# Patient Record
Sex: MALE | Race: WHITE | NOT HISPANIC OR LATINO | URBAN - METROPOLITAN AREA
[De-identification: names, ages, dates, MRNs, and addresses within clinical notes are randomized per-mention and may not be internally consistent; named-entity substitution may affect disease eponyms.]

---

## 2020-01-03 ENCOUNTER — EMERGENCY (EMERGENCY)
Facility: HOSPITAL | Age: 40
LOS: 0 days | Discharge: HOME | End: 2020-01-04
Attending: EMERGENCY MEDICINE | Admitting: EMERGENCY MEDICINE
Payer: COMMERCIAL

## 2020-01-03 VITALS
RESPIRATION RATE: 16 BRPM | OXYGEN SATURATION: 99 % | DIASTOLIC BLOOD PRESSURE: 82 MMHG | HEART RATE: 79 BPM | TEMPERATURE: 96 F | SYSTOLIC BLOOD PRESSURE: 140 MMHG | WEIGHT: 272.93 LBS

## 2020-01-03 DIAGNOSIS — R21 RASH AND OTHER NONSPECIFIC SKIN ERUPTION: ICD-10-CM

## 2020-01-03 DIAGNOSIS — T36.0X5A ADVERSE EFFECT OF PENICILLINS, INITIAL ENCOUNTER: ICD-10-CM

## 2020-01-03 PROCEDURE — 99283 EMERGENCY DEPT VISIT LOW MDM: CPT

## 2020-01-04 RX ORDER — DIPHENHYDRAMINE HCL 50 MG
25 CAPSULE ORAL EVERY 6 HOURS
Refills: 0 | Status: DISCONTINUED | OUTPATIENT
Start: 2020-01-04 | End: 2020-01-04

## 2020-01-04 RX ADMIN — Medication 50 MILLIGRAM(S): at 01:04

## 2020-01-04 RX ADMIN — Medication 25 MILLIGRAM(S): at 01:04

## 2020-01-04 NOTE — ED PROVIDER NOTE - PHYSICAL EXAMINATION
EXAM:  CONSTITUTIONAL: WA / WN / NAD  HEAD: NCAT  EYES: PERRL; EOMI; anicteric.  ENT: Normal pharynx; mucous membranes pink/moist, no erythema.  NECK: Supple; no meningeal signs  CARD: RRR; nl S1/S2; no M/R/G. Pulses equal bilaterally.  RESP: Respiratory rate and effort are normal; breath sounds clear and equal bilaterally.  ABD: Soft, NT ND nl bowel sounds; no masses; no rebound  MSK/EXT: No gross deformities; full range of motion.  SKIN: diffuse maculopapular rash  NEURO: AAOx3, Motor 5/5 x 4 extremities,

## 2020-01-04 NOTE — ED PROVIDER NOTE - NS ED ROS FT
ROS  Constitutional:  See HPI.  Eyes:  No visual changes, eye pain or discharge.  ENMT:  No hearing changes, pain, discharge or infections.   Cardiac:  No chest pain, SOB or edema.   Respiratory:  No cough or respiratory distress. No hemoptysis.  GI:  No nausea, vomiting, diarrhea or abdominal pain.  :  No dysuria, frequency or burning.  MS:  No myalgia, muscle weakness, joint pain or back pain.  Neuro:  No focal neurological complaints.  Skin:  + rash

## 2020-01-04 NOTE — ED PROVIDER NOTE - OBJECTIVE STATEMENT
39 M to ED with Rash after taking amox for pharyngitis  diffuse maculopapular rash stared on day 4 of medication  no sick contacts, no travels  mild swelling to face.

## 2020-01-04 NOTE — ED PROVIDER NOTE - PATIENT PORTAL LINK FT
You can access the FollowMyHealth Patient Portal offered by [facility] by registering at the following website: [email or web address]. By joining [facility]’s FollowMyHealth portal, you will also be able to view your health information using other applications (apps) compatible with our system.

## 2020-01-04 NOTE — ED PROVIDER NOTE - NSFOLLOWUPINSTRUCTIONS_ED_ALL_ED_FT
Rash, Adult     A rash is a change in the color of your skin. A rash can also change the way your skin feels. There are many different conditions and factors that can cause a rash.  Follow these instructions at home:  The goal of treatment is to stop the itching and keep the rash from spreading. Watch for any changes in your symptoms. Let your doctor know about them. Follow these instructions to help with your condition:  Medicine     Take or apply over-the-counter and prescription medicines only as told by your doctor. These may include medicines:  To treat red or swollen skin (corticosteroid creams).To treat itching.To treat an allergy (oral antihistamines).To treat very bad symptoms (oral corticosteroids).Skin care     Put cool cloths (compresses) on the affected areas.Do not scratch or rub your skin.Avoid covering the rash. Make sure that the rash is exposed to air as much as possible.Managing itching and discomfort     Avoid hot showers or baths. These can make itching worse. A cold shower may help.Try taking a bath with:  Epsom salts. You can get these at your local pharmacy or grocery store. Follow the instructions on the package.Baking soda. Pour a small amount into the bath as told by your doctor.Colloidal oatmeal. You can get this at your local pharmacy or grocery store. Follow the instructions on the package.Try putting baking soda paste onto your skin. Stir water into baking soda until it gets like a paste.Try putting on a lotion that relieves itchiness (calamine lotion).Keep cool and out of the sun. Sweating and being hot can make itching worse.General instructions        Rest as needed.Drink enough fluid to keep your pee (urine) pale yellow.Wear loose-fitting clothing.Avoid scented soaps, detergents, and perfumes. Use gentle soaps, detergents, perfumes, and other cosmetic products.Avoid anything that causes your rash. Keep a journal to help track what causes your rash. Write down:  What you eat.What cosmetic products you use.What you drink.What you wear. This includes jewelry.Keep all follow-up visits as told by your doctor. This is important.Contact a doctor if:  You sweat at night.You lose weight.You pee (urinate) more than normal.You pee less than normal, or you notice that your pee is a darker color than normal.You feel weak.You throw up (vomit).Your skin or the whites of your eyes look yellow (jaundice).Your skin:  Tingles.Is numb.Your rash:  Does not go away after a few days.Gets worse.You are:  More thirsty than normal.More tired than normal.You have:  New symptoms.Pain in your belly (abdomen).A fever.Watery poop (diarrhea).Get help right away if:  You have a fever and your symptoms suddenly get worse.You start to feel mixed up (confused).You have a very bad headache or a stiff neck.You have very bad joint pains or stiffness.You have jerky movements that you cannot control (seizure).Your rash covers all or most of your body. The rash may or may not be painful.You have blisters that:  Are on top of the rash.Grow larger.Grow together.Are painful.Are inside your nose or mouth.You have a rash that:  Looks like purple pinprick-sized spots all over your body.Has a "bull's eye" or looks like a target.Is red and painful, causes your skin to peel, and is not from being in the sun too long.Summary  A rash is a change in the color of your skin. A rash can also change the way your skin feels.The goal of treatment is to stop the itching and keep the rash from spreading.Take or apply over-the-counter and prescription medicines only as told by your doctor.Contact a doctor if you have new symptoms or symptoms that get worse.Keep all follow-up visits as told by your doctor. This is important.This information is not intended to replace advice given to you by your health care provider. Make sure you discuss any questions you have with your health care provider.

## 2020-05-07 ENCOUNTER — TRANSCRIPTION ENCOUNTER (OUTPATIENT)
Age: 40
End: 2020-05-07

## 2020-05-31 ENCOUNTER — TRANSCRIPTION ENCOUNTER (OUTPATIENT)
Age: 40
End: 2020-05-31

## 2020-09-10 NOTE — ED ADULT TRIAGE NOTE - NS ED NURSE DIRECT TO ROOM YN
Yes Colchicine Counseling:  Patient counseled regarding adverse effects including but not limited to stomach upset (nausea, vomiting, stomach pain, or diarrhea).  Patient instructed to limit alcohol consumption while taking this medication.  Colchicine may reduce blood counts especially with prolonged use.  The patient understands that monitoring of kidney function and blood counts may be required, especially at baseline. The patient verbalized understanding of the proper use and possible adverse effects of colchicine.  All of the patient's questions and concerns were addressed.

## 2020-10-12 ENCOUNTER — TRANSCRIPTION ENCOUNTER (OUTPATIENT)
Age: 40
End: 2020-10-12

## 2020-10-16 ENCOUNTER — TRANSCRIPTION ENCOUNTER (OUTPATIENT)
Age: 40
End: 2020-10-16

## 2020-11-09 ENCOUNTER — TRANSCRIPTION ENCOUNTER (OUTPATIENT)
Age: 40
End: 2020-11-09

## 2021-02-23 ENCOUNTER — OUTPATIENT (OUTPATIENT)
Dept: OUTPATIENT SERVICES | Facility: HOSPITAL | Age: 41
LOS: 1 days | Discharge: HOME | End: 2021-02-23

## 2021-02-23 DIAGNOSIS — Z11.59 ENCOUNTER FOR SCREENING FOR OTHER VIRAL DISEASES: ICD-10-CM

## 2021-09-28 PROBLEM — Z00.00 ENCOUNTER FOR PREVENTIVE HEALTH EXAMINATION: Status: ACTIVE | Noted: 2021-09-28

## 2021-10-01 ENCOUNTER — APPOINTMENT (OUTPATIENT)
Dept: SURGERY | Facility: CLINIC | Age: 41
End: 2021-10-01
Payer: COMMERCIAL

## 2021-10-01 ENCOUNTER — TRANSCRIPTION ENCOUNTER (OUTPATIENT)
Age: 41
End: 2021-10-01

## 2021-10-01 VITALS
HEART RATE: 75 BPM | SYSTOLIC BLOOD PRESSURE: 118 MMHG | WEIGHT: 280.4 LBS | DIASTOLIC BLOOD PRESSURE: 74 MMHG | HEIGHT: 69.25 IN | BODY MASS INDEX: 41.06 KG/M2 | OXYGEN SATURATION: 98 % | TEMPERATURE: 97.1 F

## 2021-10-01 DIAGNOSIS — M25.561 PAIN IN RIGHT KNEE: ICD-10-CM

## 2021-10-01 DIAGNOSIS — U07.1 COVID-19: ICD-10-CM

## 2021-10-01 DIAGNOSIS — Z78.9 OTHER SPECIFIED HEALTH STATUS: ICD-10-CM

## 2021-10-01 DIAGNOSIS — E66.01 MORBID (SEVERE) OBESITY DUE TO EXCESS CALORIES: ICD-10-CM

## 2021-10-01 DIAGNOSIS — I10 ESSENTIAL (PRIMARY) HYPERTENSION: ICD-10-CM

## 2021-10-01 PROCEDURE — 99204 OFFICE O/P NEW MOD 45 MIN: CPT

## 2021-10-01 PROCEDURE — 99072 ADDL SUPL MATRL&STAF TM PHE: CPT

## 2021-10-01 RX ORDER — NEBIVOLOL HYDROCHLORIDE 5 MG/1
5 TABLET ORAL DAILY
Refills: 0 | Status: ACTIVE | COMMUNITY

## 2021-10-01 RX ORDER — LOSARTAN POTASSIUM AND HYDROCHLOROTHIAZIDE 12.5; 1 MG/1; MG/1
100-12.5 TABLET ORAL DAILY
Refills: 0 | Status: ACTIVE | COMMUNITY

## 2021-10-01 NOTE — PHYSICAL EXAM
[Normal] : affect appropriate [de-identified] : Mallampati II [de-identified] : Soft, nondistended.

## 2021-10-01 NOTE — HISTORY OF PRESENT ILLNESS
[FreeTextEntry1] : ENRIQUE VENEGAS is a 41 year male who has a overweight for sometime. He gained 30 lbs in the over the last 3 years and is having difficulty losing the weight. Patient states that he works a flex shift and needs dietary support to help him with meal planning. He is very active on his job and we discussed that he was just active and will require a formal exercise plan 3-4 days/week for a minimum of 30 minutes. We discussed realistic weight loss expectations as he would like to lose 80 lbs.

## 2021-10-01 NOTE — REASON FOR VISIT
[Initial Consultation] : an initial consultation for [Obesity] : obesity [FreeTextEntry2] : Patient presents for Medical Weight Loss consultation

## 2021-10-01 NOTE — ASSESSMENT
[FreeTextEntry1] : ENRIQUE VENEGAS is a 41 year male seen today for Medical Weight Loss Consultation. Patient states that he is not interested in antiobesity medication or surgery at this time but wants to work with a dietitian to assist him with meal planning.\par \par Plan: Blood work.\par          Increase exercise.\par          Dietitian evaluation.\par          RTO in 1 month. \par          Call with concerns.

## 2021-10-13 ENCOUNTER — NON-APPOINTMENT (OUTPATIENT)
Age: 41
End: 2021-10-13

## 2021-11-04 ENCOUNTER — APPOINTMENT (OUTPATIENT)
Dept: SURGERY | Facility: CLINIC | Age: 41
End: 2021-11-04

## 2022-09-26 ENCOUNTER — NON-APPOINTMENT (OUTPATIENT)
Age: 42
End: 2022-09-26

## 2023-02-16 ENCOUNTER — NON-APPOINTMENT (OUTPATIENT)
Age: 43
End: 2023-02-16

## 2024-01-12 ENCOUNTER — NON-APPOINTMENT (OUTPATIENT)
Age: 44
End: 2024-01-12

## 2024-08-29 ENCOUNTER — NON-APPOINTMENT (OUTPATIENT)
Age: 44
End: 2024-08-29

## 2025-02-27 ENCOUNTER — NON-APPOINTMENT (OUTPATIENT)
Age: 45
End: 2025-02-27

## 2025-08-30 ENCOUNTER — NON-APPOINTMENT (OUTPATIENT)
Age: 45
End: 2025-08-30